# Patient Record
Sex: FEMALE | URBAN - METROPOLITAN AREA
[De-identification: names, ages, dates, MRNs, and addresses within clinical notes are randomized per-mention and may not be internally consistent; named-entity substitution may affect disease eponyms.]

---

## 2018-10-23 ENCOUNTER — APPOINTMENT (OUTPATIENT)
Dept: URBAN - METROPOLITAN AREA CLINIC 190 | Age: 33
Setting detail: DERMATOLOGY
End: 2018-10-26

## 2018-11-30 ENCOUNTER — APPOINTMENT (OUTPATIENT)
Dept: URBAN - METROPOLITAN AREA CLINIC 190 | Age: 33
Setting detail: DERMATOLOGY
End: 2018-12-03

## 2018-11-30 DIAGNOSIS — Z41.9 ENCOUNTER FOR PROCEDURE FOR PURPOSES OTHER THAN REMEDYING HEALTH STATE, UNSPECIFIED: ICD-10-CM

## 2018-11-30 PROCEDURE — OTHER WAXING: OTHER

## 2018-11-30 PROCEDURE — OTHER FACIAL: OTHER

## 2018-11-30 PROCEDURE — OTHER DERMAPLANE: OTHER

## 2018-11-30 ASSESSMENT — LOCATION SIMPLE DESCRIPTION DERM
LOCATION SIMPLE: CHIN
LOCATION SIMPLE: RIGHT EYEBROW
LOCATION SIMPLE: LEFT EYEBROW
LOCATION SIMPLE: INFERIOR FOREHEAD
LOCATION SIMPLE: LEFT CHEEK
LOCATION SIMPLE: RIGHT FOREHEAD
LOCATION SIMPLE: RIGHT CHEEK
LOCATION SIMPLE: GLABELLA

## 2018-11-30 ASSESSMENT — LOCATION DETAILED DESCRIPTION DERM
LOCATION DETAILED: LEFT CENTRAL MALAR CHEEK
LOCATION DETAILED: RIGHT INFERIOR CENTRAL MALAR CHEEK
LOCATION DETAILED: RIGHT CENTRAL EYEBROW
LOCATION DETAILED: LEFT CENTRAL EYEBROW
LOCATION DETAILED: LEFT INFERIOR CENTRAL MALAR CHEEK
LOCATION DETAILED: RIGHT CHIN
LOCATION DETAILED: RIGHT INFERIOR MEDIAL MALAR CHEEK
LOCATION DETAILED: RIGHT INFERIOR MEDIAL FOREHEAD
LOCATION DETAILED: INFERIOR MID FOREHEAD
LOCATION DETAILED: GLABELLA
LOCATION DETAILED: LEFT CHIN

## 2018-11-30 ASSESSMENT — LOCATION ZONE DERM: LOCATION ZONE: FACE

## 2018-11-30 NOTE — PROCEDURE: DERMAPLANE
Price (Use Numbers Only, No Special Characters Or $): 0
Treatment Areas: face
Detail Level: Zone
Blade: 10R blade scalpel
Pre-Procedure Text: The patient was placed in a recumbant position on the procedure table.
Comments: PREP - ENVIRON CLEANSE WITH MASK - STEAM FOR 5 MINUTES
Treatment Area Prep Override: Alpha Forte toner and ACE OIL
Post-Care Instructions: I reviewed with the patient in detail post-care instructions.

## 2018-11-30 NOTE — PROCEDURE: FACIAL
Exfoliation Type: perfecting shana
Detail Level: Simple
Treatment Type (Optional): Spa Facial
Mask Type (Optional): shana-based
Extraction Method: extractor
Exfoliation Type Override: ENVIRON ADRIAN MASK
Comments (Sticky): MEDI-FACIAL - ENVIRON CLEANSE WITH MASK (STEAM FOR 5 MINUTES) REMOVED WITH WARM TOWEL. WAX EYEBROWS - SKIN UNDER LEFT EYEBROW WAS VERY PINK, VERY CLOSE TO LIFTING, BUT DIDNT LIFT.  TOLD PT TO MAKE SURE NOT TO USE TRET FOR 5 DAYS BEFORE WAXING.  DERMAPLANE PROCEUDRE, APPLIED 2% LACTIC ACID FOR 2 MINUTES, REMOVED AND APPLIED TX GEL AND ALGINATE MASK FOR 7 MINUTES. REMOVED AND APPLIED ENVIRON C-QUENCE 4 AND ELTA DAILY TINTED

## 2018-11-30 NOTE — PROCEDURE: WAXING
Post-Care Instructions: I reviewed with the patient in detail post-care instructions. Patient should avoid sun exposure and wear sun protection.
Detail Level: Zone
Techique: The unwanted hair in the treatment area(s) were removed using wax.  TRIMMED EYEBROWS WITH SCISSORS BEFORE WAXING.
Price (Use Numbers Only, No Special Characters Or $): 0

## 2019-01-22 ENCOUNTER — APPOINTMENT (OUTPATIENT)
Dept: URBAN - METROPOLITAN AREA CLINIC 190 | Age: 34
Setting detail: DERMATOLOGY
End: 2019-01-22

## 2019-01-22 DIAGNOSIS — Z41.9 ENCOUNTER FOR PROCEDURE FOR PURPOSES OTHER THAN REMEDYING HEALTH STATE, UNSPECIFIED: ICD-10-CM

## 2019-01-22 PROCEDURE — OTHER WAXING: OTHER

## 2019-01-22 PROCEDURE — OTHER DERMAPLANE: OTHER

## 2019-01-22 PROCEDURE — OTHER FACIAL: OTHER

## 2019-01-22 ASSESSMENT — LOCATION DETAILED DESCRIPTION DERM
LOCATION DETAILED: RIGHT LATERAL EYEBROW
LOCATION DETAILED: LEFT CENTRAL EYEBROW
LOCATION DETAILED: INFERIOR MID FOREHEAD
LOCATION DETAILED: RIGHT INFERIOR CENTRAL MALAR CHEEK
LOCATION DETAILED: RIGHT INFERIOR MEDIAL FOREHEAD
LOCATION DETAILED: LEFT INFERIOR CENTRAL MALAR CHEEK
LOCATION DETAILED: RIGHT SUPERIOR MEDIAL BUCCAL CHEEK
LOCATION DETAILED: GLABELLA
LOCATION DETAILED: RIGHT CHIN

## 2019-01-22 ASSESSMENT — LOCATION SIMPLE DESCRIPTION DERM
LOCATION SIMPLE: LEFT EYEBROW
LOCATION SIMPLE: RIGHT EYEBROW
LOCATION SIMPLE: INFERIOR FOREHEAD
LOCATION SIMPLE: LEFT CHEEK
LOCATION SIMPLE: RIGHT FOREHEAD
LOCATION SIMPLE: GLABELLA
LOCATION SIMPLE: RIGHT CHEEK
LOCATION SIMPLE: CHIN

## 2019-01-22 ASSESSMENT — LOCATION ZONE DERM: LOCATION ZONE: FACE

## 2019-01-22 NOTE — PROCEDURE: WAXING
Techique: The unwanted hair in the treatment area(s) were removed using wax. Trimmed hair with scissors as well\\n\\n
Post-Care Instructions: I reviewed with the patient in detail post-care instructions. Patient should avoid sun exposure and wear sun protection.
Price (Use Numbers Only, No Special Characters Or $): 0
Detail Level: Zone

## 2019-01-22 NOTE — PROCEDURE: FACIAL
Exfoliation Type Override: ENVIRON ADRIAN MASK
Exfoliation Type: perfecting shana
Detail Level: Simple
Treatment Type (Optional): Spa Facial
Mask Type (Optional): shana-based
Facial Steaming: steamed
Comments (Sticky): Environ cleanse with mask - put under steam for 5 minutes with mask on / removed with warm towel. Dermaplaning.  Applied Lactic acid 2.5% cream for 4 minutes and removed with warm towel.  Applied Tx gel under alginate mask for 5 minutes.  Removed mask and applied intense retinol serum, colostrum gel, avst 1, and elta physical\\n\\nSAMPLES - obagi hydrate, hydrating oil capsules, elta elements, SBS lines\\n\\nPatient recently had an allergic reaction due to a medication she was taking.  As a result her skin has been extremely dry, our focus today was putting hydration back into the skin and keeping the skin calm - reaction was 3+ weeks ago.

## 2019-01-22 NOTE — PROCEDURE: DERMAPLANE
Blade: 10R blade scalpel
Price (Use Numbers Only, No Special Characters Or $): 0
Post-Care Instructions: I reviewed with the patient in detail post-care instructions.
Pre-Procedure Text: The patient was placed in a recumbant position on the procedure table.
Treatment Area Prep Override: Environ Cleanse with mask
Detail Level: Zone
Treatment Areas: face